# Patient Record
Sex: FEMALE | Race: WHITE | Employment: FULL TIME | URBAN - METROPOLITAN AREA
[De-identification: names, ages, dates, MRNs, and addresses within clinical notes are randomized per-mention and may not be internally consistent; named-entity substitution may affect disease eponyms.]

---

## 2023-01-17 ENCOUNTER — OFFICE VISIT (OUTPATIENT)
Dept: OBGYN CLINIC | Age: 28
End: 2023-01-17
Payer: COMMERCIAL

## 2023-01-17 VITALS
WEIGHT: 147 LBS | DIASTOLIC BLOOD PRESSURE: 74 MMHG | BODY MASS INDEX: 23.07 KG/M2 | HEIGHT: 67 IN | SYSTOLIC BLOOD PRESSURE: 122 MMHG

## 2023-01-17 DIAGNOSIS — Z12.4 PAP SMEAR FOR CERVICAL CANCER SCREENING: ICD-10-CM

## 2023-01-17 DIAGNOSIS — Z01.419 WELL WOMAN EXAM WITH ROUTINE GYNECOLOGICAL EXAM: Primary | ICD-10-CM

## 2023-01-17 PROCEDURE — 99385 PREV VISIT NEW AGE 18-39: CPT | Performed by: OBSTETRICS & GYNECOLOGY

## 2023-01-17 NOTE — PROGRESS NOTES
CC:  Annual GYN exam    HPI:  27 y.o.  No obstetric history on file. presents today for a routine gynecological examination.  Patient's last menstrual period was 2022..    Pt additionally wants to discuss preconception counseling     PCP: none in the area. Reports hx of thyroid nodule that has been evaluated and normal     Contraception:  trying to conceive       Menses:  Regular   Sexually active w/ 1 male ayana   No changes in sexual partners --declines STD testing          Ob hx:  G0        GYN HISTORY:  As per HPI     Last Pap:  1-2 years ago   Hx of Abnl Paps: no     Hx STDs: no  Gardasil vaccine: unsure             OB History          0    Para   0    Term   0       0    AB   0    Living   0         SAB   0    IAB   0    Ectopic   0    Molar   0    Multiple   0    Live Births   0                  No past medical history on file.      Past Surgical History:   Procedure Laterality Date    COLONOSCOPY      TONSILLECTOMY           Outpatient Encounter Medications as of 2023   Medication Sig Dispense Refill    [DISCONTINUED] TRI-SPRINTEC 0.18/0.215/0.25 MG-35 MCG TABS        No facility-administered encounter medications on file as of 2023.         No Known Allergies      Family History   Problem Relation Age of Onset    Breast Cancer Neg Hx     Colon Cancer Neg Hx     Ovarian Cancer Neg Hx          Social History     Socioeconomic History    Marital status: Single   Tobacco Use    Smoking status: Never   Vaping Use    Vaping Use: Never used   Substance and Sexual Activity    Alcohol use: No    Drug use: Never    Sexual activity: Yes     Partners: Male     Comment: OCP           ROS:  Constitutional: Negative for chills, fever and weight loss.   HENT: Negative for hearing loss.   Eyes: Negative for blurred vision and double vision.   Respiratory: Negative for cough, hemoptysis and shortness of breath.   Cardiovascular: Negative for chest pain, palpitations and orthopnea.  Gastrointestinal: Negative for abdominal pain, blood in stool, constipation, diarrhea, nausea and vomiting. Genitourinary: Negative for dysuria, frequency, hematuria and urgency. Musculoskeletal: Negative for falls, joint pain and myalgias. Skin: Negative for itching and rash. Neurological: Negative for headaches. Endo/Heme/Allergies: Does not bruise/bleed easily. Psychiatric/Behavioral: Negative for depression and suicidal ideas. The patient is not nervous/anxious. All other systems reviewed and are negative. PHYSICAL EXAM:  Blood pressure 122/74, height 5' 7\" (1.702 m), weight 147 lb (66.7 kg), last menstrual period 12/29/2022. Constitutional: She appears well-developed and well-nourished. No distress. HENT:    Head: Normocephalic and atraumatic. Neck: Normal range of motion. No thyromegaly present. Cardiovascular: Normal rate, regular rhythm and normal heart sounds. Exam reveals no gallop and no friction rub. No murmur heard. Pulmonary/Chest: Effort normal and breath sounds normal. No respiratory distress. She has no wheezes. She has no rales. Abdominal: Soft. Bowel sounds are normal. She exhibits no distension and no mass. There is no tenderness. There is no rebound and no guarding. Skin: She is not diaphoretic. Psychiatric: She has a normal mood and affect.  Her behavior is normal. Thought content normal. .    Pelvic:   External genitalia wnl, no lesions, rashes  Clitoris and urethra midline  Vagina pink, moist, well rugated  Cervix without lesion/masses, DC wnl  Uterus normal in size and contour, no masses, NTTP  Adnexa without masses, NTTP    Breasts:   Symmetric, no lesions, masses, rashes, no abnl nipple Dc       Counseling:  Discussed General Recommendations:  -Routine Pap (unless cervix removed for benign reasons)  -STD screening annually pts </= 24yo or high risk   -lipid profile every 5 yrs  -Tdap once and then Td every 10yrs  -Influenza Vaccine, annually  -Healthy eating/exercise   -HPV vaccine newest recs (8yo-46yo)        ASSESSMENT/PLAN:   28 y.o., No obstetric history on file. , for annual GYN exam:    1) Annual:   -Pap today    -declines STD testing    -Rx none    -safe sexual practices    -FU w/ PCP for all non-GYN medical issues and regular screening     -annual Flu vaccine recommended    -healthy eating, exercise     Referral placed for PCP  If > 6 mo and no pregnancy call and will get labs  Preconception counseling done  On PNV       Justyna Rizo, DO

## 2023-01-20 LAB
CYTOLOGIST CVX/VAG CYTO: NORMAL
CYTOLOGY CVX/VAG DOC THIN PREP: NORMAL
HPV REFLEX: NORMAL
Lab: NORMAL
PATH REPORT.FINAL DX SPEC: NORMAL
STAT OF ADQ CVX/VAG CYTO-IMP: NORMAL